# Patient Record
Sex: MALE | Race: OTHER | HISPANIC OR LATINO | URBAN - METROPOLITAN AREA
[De-identification: names, ages, dates, MRNs, and addresses within clinical notes are randomized per-mention and may not be internally consistent; named-entity substitution may affect disease eponyms.]

---

## 2021-07-19 ENCOUNTER — EMERGENCY (EMERGENCY)
Facility: HOSPITAL | Age: 26
LOS: 1 days | Discharge: HOME | End: 2021-07-19
Attending: EMERGENCY MEDICINE
Payer: SELF-PAY

## 2021-07-19 VITALS
TEMPERATURE: 98 F | SYSTOLIC BLOOD PRESSURE: 159 MMHG | RESPIRATION RATE: 14 BRPM | WEIGHT: 184.97 LBS | HEART RATE: 91 BPM | OXYGEN SATURATION: 99 % | DIASTOLIC BLOOD PRESSURE: 91 MMHG

## 2021-07-19 PROCEDURE — 99283 EMERGENCY DEPT VISIT LOW MDM: CPT | Mod: 25

## 2021-07-19 PROCEDURE — 90715 TDAP VACCINE 7 YRS/> IM: CPT

## 2021-07-19 PROCEDURE — 90471 IMMUNIZATION ADMIN: CPT

## 2021-07-19 PROCEDURE — 12001 RPR S/N/AX/GEN/TRNK 2.5CM/<: CPT

## 2021-07-19 RX ORDER — TETANUS TOXOID, REDUCED DIPHTHERIA TOXOID AND ACELLULAR PERTUSSIS VACCINE, ADSORBED 5; 2.5; 8; 8; 2.5 [IU]/.5ML; [IU]/.5ML; UG/.5ML; UG/.5ML; UG/.5ML
0.5 SUSPENSION INTRAMUSCULAR ONCE
Refills: 0 | Status: COMPLETED | OUTPATIENT
Start: 2021-07-19 | End: 2021-07-19

## 2021-07-19 RX ADMIN — TETANUS TOXOID, REDUCED DIPHTHERIA TOXOID AND ACELLULAR PERTUSSIS VACCINE, ADSORBED 0.5 MILLILITER(S): 5; 2.5; 8; 8; 2.5 SUSPENSION INTRAMUSCULAR at 22:33

## 2021-07-19 NOTE — ED PROVIDER NOTE - CLINICAL SUMMARY MEDICAL DECISION MAKING FREE TEXT BOX
Copious irrigation, tetanus shot, wound care explained to pt, bacitrician/neosporin signs of infection discussed with pt, pt explained dc instructions return to the ed for signs of infection/worsening sxs, sutures removed in 10 days

## 2021-07-19 NOTE — ED ADULT TRIAGE NOTE - CHIEF COMPLAINT QUOTE
Pt presents c/o laceration to left upper leg after cutting himself with a  this evening. Bleeding is controlled at this time. Patient is c/o 5/10 pain to the leg. Did not medicate for pain prior to arrival. Patient is ambulating on leg in triage.

## 2021-07-19 NOTE — ED PROVIDER NOTE - PATIENT PORTAL LINK FT
You can access the FollowMyHealth Patient Portal offered by [facility] by registering at the following website: [email or web address]. By joining [employer]’s FollowMyHealth portal, you will also be able to view your health information using other applications (apps) compatible with our system.

## 2021-07-19 NOTE — ED PROVIDER NOTE - PHYSICAL EXAMINATION
Const: Awake, alert and oriented. In no acute distress. Well appearing.  HEENT: NC/AT. Moist mucous membranes.  Eyes: No scleral icterus. EOMI.  Neck:. Soft and supple. Full ROM without pain.  Cardiac: +S1/S2. No murmurs. Peripheral pulses 2+ and symmetric. No LE edema.  Resp: Speaking in full sentences. No evidence of respiratory distress. No wheezes, rales or rhonchi.  Abd: Soft, non-tender, non-distended. Normal bowel sounds in all 4 quadrants. No guarding or rebound.  MSK: FROM in all extremities neurovascularly intact DP palpable   Back: Spine midline and non-tender. No CVAT.  Skin: Laceration noted to left upper leg 2.0 cm no tendon involvement  Lymph: No cervical lymphadenopathy.  Neuro: Awake, alert & oriented x 3. Moves all extremities symmetrically.

## 2021-07-19 NOTE — ED PROVIDER NOTE - OBJECTIVE STATEMENT
PT IS A 25 y/o male presenting to the ed for laceration to the left upper leg due to a . pt denies head injury or LOC. pt is not on blood thinners pt not up to date with tetanus. pt denies headache neck pain visual changes back pain abd pain nausea vomiting numbness or loss of sensation

## 2021-08-15 NOTE — ED PROCEDURE NOTE - CPROC ED TOLERANCE1
Called into [facility]  zolpidem (AMBIEN) 10 MG tablet [[id number]]   Order Details   Dose, Route, Frequency: As Directed Note to Pharmacy:   Rx to last 30 days. Dispense Quantity:  30 tablet Refills:  5 Fills remaining:  --           Sig: TAKE ONE-HALF TO ONE TABLET BY MOUTH EVERY NIGHT AT BEDTIME.          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)      Original Order:  zolpidem (AMBIEN) 10 MG tablet [[id number]]      Pharmacy:  [address] [place] [address] [phone] - F [phone]      Pharmacy Comments:  --          Fill quantity remaining:  -- Fill quantity used:  --        Order Class:     Order Class   Print [12]   Medication Administration Instructions     TAKE ONE-HALF TO ONE TABLET BY MOUTH EVERY NIGHT AT BEDTIME   Warnings History     No Interaction Warnings Shown    Order Audit Trail     Number of times this order has been changed since signin   Order Audit [place]   zolpidem (AMBIEN) 10 MG tablet   Date: 2018 Department: [facility] [address] Ordering/Authorizing: Alysia Canchola MD   Medication Detail      Disp Refills Start End    zolpidem (AMBIEN) 10 MG tablet 30 tablet 5 2018 10/15/2018    Sig: TAKE ONE-HALF TO ONE TABLET BY MOUTH EVERY NIGHT AT BEDTIME. Class: Print    Notes to Pharmacy: Rx to last 30 days. Patient tolerated procedure well.
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
